# Patient Record
Sex: MALE | Race: WHITE | NOT HISPANIC OR LATINO | Employment: OTHER | URBAN - METROPOLITAN AREA
[De-identification: names, ages, dates, MRNs, and addresses within clinical notes are randomized per-mention and may not be internally consistent; named-entity substitution may affect disease eponyms.]

---

## 2022-12-22 ENCOUNTER — OFFICE VISIT (OUTPATIENT)
Dept: PULMONOLOGY | Facility: CLINIC | Age: 47
End: 2022-12-22

## 2022-12-22 VITALS
WEIGHT: 265 LBS | DIASTOLIC BLOOD PRESSURE: 90 MMHG | HEIGHT: 72 IN | HEART RATE: 81 BPM | SYSTOLIC BLOOD PRESSURE: 148 MMHG | TEMPERATURE: 99.1 F | OXYGEN SATURATION: 96 % | BODY MASS INDEX: 35.89 KG/M2

## 2022-12-22 DIAGNOSIS — J30.89 SEASONAL AND PERENNIAL ALLERGIC RHINITIS: ICD-10-CM

## 2022-12-22 DIAGNOSIS — R05.3 CHRONIC COUGH: Primary | ICD-10-CM

## 2022-12-22 DIAGNOSIS — E66.9 OBESITY, CLASS II, BMI 35-39.9: ICD-10-CM

## 2022-12-22 DIAGNOSIS — J30.2 SEASONAL AND PERENNIAL ALLERGIC RHINITIS: ICD-10-CM

## 2022-12-22 DIAGNOSIS — K21.9 CHRONIC GERD: ICD-10-CM

## 2022-12-22 PROBLEM — E66.812 OBESITY, CLASS II, BMI 35-39.9: Status: ACTIVE | Noted: 2022-12-22

## 2022-12-22 PROCEDURE — 99204 OFFICE O/P NEW MOD 45 MIN: CPT | Performed by: INTERNAL MEDICINE

## 2022-12-22 RX ORDER — SOLIFENACIN SUCCINATE 5 MG/1
5 TABLET, FILM COATED ORAL DAILY
COMMUNITY
Start: 2022-12-19

## 2022-12-22 RX ORDER — MONTELUKAST SODIUM 10 MG/1
10 TABLET ORAL DAILY
COMMUNITY
Start: 2022-11-21

## 2022-12-22 RX ORDER — TADALAFIL 5 MG/1
5 TABLET ORAL
COMMUNITY
Start: 2012-10-22

## 2022-12-22 RX ORDER — POLYETHYLENE GLYCOL 3350 17 G/17G
POWDER, FOR SOLUTION ORAL
COMMUNITY
Start: 2022-12-19

## 2022-12-22 RX ORDER — OXYCODONE AND ACETAMINOPHEN 10; 325 MG/1; MG/1
TABLET ORAL
COMMUNITY
Start: 2012-08-22

## 2022-12-22 RX ORDER — CHLORTHALIDONE 25 MG/1
TABLET ORAL
COMMUNITY
Start: 2022-11-08

## 2022-12-22 RX ORDER — DULOXETIN HYDROCHLORIDE 60 MG/1
CAPSULE, DELAYED RELEASE ORAL
COMMUNITY
Start: 2018-09-22

## 2022-12-22 RX ORDER — FEXOFENADINE HCL 180 MG/1
TABLET ORAL
COMMUNITY
Start: 2010-12-22

## 2022-12-22 RX ORDER — ATENOLOL 100 MG/1
100 TABLET ORAL EVERY EVENING
COMMUNITY
Start: 2022-12-17

## 2022-12-22 RX ORDER — DOXAZOSIN 8 MG/1
8 TABLET ORAL DAILY
COMMUNITY
Start: 2022-12-19

## 2022-12-22 RX ORDER — GABAPENTIN 600 MG/1
600 TABLET ORAL 3 TIMES DAILY
COMMUNITY

## 2022-12-22 RX ORDER — NAPROXEN SODIUM 220 MG
TABLET ORAL
COMMUNITY
Start: 2012-08-22

## 2022-12-22 RX ORDER — FENTANYL 75 UG/H
PATCH TRANSDERMAL
COMMUNITY
Start: 2012-12-22

## 2022-12-22 RX ORDER — OMEPRAZOLE 40 MG/1
CAPSULE, DELAYED RELEASE ORAL
COMMUNITY
Start: 2012-10-22

## 2022-12-22 RX ORDER — IPRATROPIUM BROMIDE 21 UG/1
2 SPRAY, METERED NASAL EVERY 12 HOURS
Qty: 1 EACH | Refills: 12 | Status: SHIPPED | OUTPATIENT
Start: 2022-12-22

## 2022-12-22 NOTE — PROGRESS NOTES
PULMONARY  NOTE    Chief Complaint     Chronic cough, non-smoker, left lower extremity paralysis following surgery    History of Present Illness     47-year-old male referred for chronic cough    He is a non-smoker with no past history of known lung disease    Has had a chronic cough since approximately February 2022  He does not recollect any preceding illness such as COVID that precipitated the cough    The cough has been constant but worse at night  He typically produces a lot of mucus at night  Has had no hemoptysis    He does have postnasal drip and a globus sensation on a regular basis    He had been on lisinopril but that was changed.  That did not impact his cough    An empiric trial on antibiotics and steroids helped transiently    He was started on Prilosec empirically but that did not seem to impact his cough, either  He does not describe regular reflux symptoms    He has had a paralyzed left lower extremity since back surgery in 2012  He gets around an electric wheelchair    Patient Active Problem List   Diagnosis   • Chronic cough   • Perennial rhinitis   • R/O GERD   • Obesity, Class II, BMI 35-39.9     No Known Allergies    Current Outpatient Medications:   •  atenolol (TENORMIN) 100 MG tablet, Take 100 mg by mouth Every Evening., Disp: , Rfl:   •  chlorthalidone (HYGROTON) 25 MG tablet, , Disp: , Rfl:   •  doxazosin (CARDURA) 8 MG tablet, Take 8 mg by mouth Daily., Disp: , Rfl:   •  DULoxetine (CYMBALTA) 60 MG capsule, Cymbalta 60 mg capsule,delayed release  Take 1 capsule every day by oral route as directed for 30 days., Disp: , Rfl:   •  fentaNYL (DURAGESIC) 75 MCG/HR patch, fentanyl 75 mcg/hr transdermal patch  Apply 1 patch every 72 hours by transdermal route as directed for 30 days., Disp: , Rfl:   •  fexofenadine (ALLEGRA) 180 MG tablet, , Disp: , Rfl:   •  gabapentin (NEURONTIN) 600 MG tablet, Take 600 mg by mouth 3 (Three) Times a Day., Disp: , Rfl:   •  montelukast (SINGULAIR) 10 MG  "tablet, Take 10 mg by mouth Daily., Disp: , Rfl:   •  naproxen sodium (ALEVE) 220 MG tablet, , Disp: , Rfl:   •  omeprazole (priLOSEC) 40 MG capsule, , Disp: , Rfl:   •  oxyCODONE-acetaminophen (PERCOCET)  MG per tablet, oxycodone-acetaminophen 10 mg-325 mg tablet  Take 1 tablet every 6-8 hours by oral route as directed for 30 days., Disp: , Rfl:   •  polyethylene glycol (MIRALAX) 17 GM/SCOOP powder, mix 1 cap full with 8 ounces of liquid, Disp: , Rfl:   •  solifenacin (VESICARE) 5 MG tablet, Take 5 mg by mouth Daily., Disp: , Rfl:   •  tadalafil (CIALIS) 5 MG tablet, Take 5 mg by mouth., Disp: , Rfl:   MEDICATION LIST AND ALLERGIES REVIEWED.    Family History   Problem Relation Age of Onset   • Diabetes Mother    • Hypertension Mother    • Cancer Father    • Diabetes Father    • Hypertension Father    • Hypertension Sister    • Asthma Maternal Grandmother    • Hypertension Maternal Grandfather    • Cancer Paternal Grandfather    • Hypertension Paternal Grandfather      Social History     Tobacco Use   • Smoking status: Never   • Smokeless tobacco: Never   Vaping Use   • Vaping Use: Never used   Substance Use Topics   • Alcohol use: Never   • Drug use: Never     Social History     Social History Narrative        Disabled    Lifelong non-smoker    Does not drink alcohol     FAMILY AND SOCIAL HISTORY REVIEWED.    Review of Systems  ALSO REFER TO SCANNED ROS SHEET FROM SAME DATE.    /90   Pulse 81   Temp 99.1 °F (37.3 °C) (Infrared)   Ht 182.9 cm (72\")   Wt 120 kg (265 lb)   SpO2 96% Comment: RESTING ROOM AIR  BMI 35.94 kg/m²   Physical Exam  Vitals and nursing note reviewed.   Constitutional:       General: He is not in acute distress.     Appearance: He is well-developed. He is not diaphoretic.   HENT:      Head: Normocephalic and atraumatic.   Neck:      Thyroid: No thyromegaly.   Cardiovascular:      Rate and Rhythm: Normal rate and regular rhythm.      Heart sounds: Normal heart sounds. No " murmur heard.  Pulmonary:      Effort: Pulmonary effort is normal.      Breath sounds: Normal breath sounds. No stridor.   Lymphadenopathy:      Cervical: No cervical adenopathy.      Upper Body:      Right upper body: No supraclavicular or epitrochlear adenopathy.      Left upper body: No supraclavicular or epitrochlear adenopathy.   Skin:     General: Skin is warm and dry.   Neurological:      Mental Status: He is alert and oriented to person, place, and time.      Comments: Patient got around the office today an electric wheelchair   Psychiatric:         Behavior: Behavior normal.         Results     Chest x-ray done at [facility] on 11/14/2022 revealed no effusions, infiltrates, or consolidation    PFTs also done at [facility] on 12/1/2022 reviewed  No airway obstruction  Reduced TLC but validity in question as the single breath alveolar volume appears to be greater than the TLC    Immunization History   Administered Date(s) Administered   • COVID-19 (MODERNA) 1st, 2nd, 3rd Dose Only 05/17/2021, 06/14/2021, 12/14/2021   • COVID-19 (MODERNA) BIVALENT BOOSTER 12+YRS 11/29/2022   • Influenza, Unspecified 12/14/2021, 11/29/2022     Problem List       ICD-10-CM ICD-9-CM   1. Chronic cough  R05.3 786.2   2. Perennial rhinitis  J30.89 477.9    J30.2    3. R/O GERD  K21.9 530.81   4. Obesity, Class II, BMI 35-39.9  E66.9 278.00       Discussion     His exam is unremarkable  Chest x-ray was clear last month  PFTs suggest a restrictive defect but the values are invalid and that the single breath alveolar volume is greater than the TLC which is not physiologically possible    He does appear to have perennial rhinitis symptoms which may be contributing  Despite his lack of response to empiric Prilosec, reflux may be a contributing factor as well    I gave him an information sheet on acid reflux we discussed reflux precautions  I am going to put him on Atrovent nasal spray in the evening and add an antihistamine to  his Singulair for his perennial rhinitis symptoms    In addition, we will obtain a CT scan of the chest to help evaluate for underlying interstitial lung disease    If he does have evidence of interstitial lung disease and we will probably go ahead and repeat his PFTs and try to get an accurate assessment of static lung volumes    I plan to see him back after the above    Moderate level of Medical Decision Making complexity based on 1 undiagnosed new problem, independent interpretation of tests, and/or prescription drug management     Anjel Foreman MD  Note electronically signed    CC: Yunier Spring MD

## 2022-12-23 DIAGNOSIS — R05.3 CHRONIC COUGH: Primary | ICD-10-CM

## 2022-12-29 ENCOUNTER — TRANSCRIBE ORDERS (OUTPATIENT)
Dept: PULMONOLOGY | Facility: CLINIC | Age: 47
End: 2022-12-29
Payer: MEDICARE

## 2022-12-30 ENCOUNTER — PATIENT ROUNDING (BHMG ONLY) (OUTPATIENT)
Dept: PULMONOLOGY | Facility: CLINIC | Age: 47
End: 2022-12-30

## 2022-12-30 NOTE — PROGRESS NOTES
December 30, 2022    Hello, may I speak with Frankie Mello?    My name is Pepito Perez     I am  with [facility]  [address] [address] [phone].    Before we get started may I verify your date of birth? 1975    I am calling to officially welcome you to our practice and ask about your recent visit. Is this a good time to talk? YES    Tell me about your visit with us. What things went well? EVERYTHING WAS GOOD. THE DR WAS GREAT        We're always looking for ways to make our patients' experiences even better. Do you have recommendations on ways we may improve?  NO    Overall were you satisfied with your first visit to our practice? YES       I appreciate you taking the time to speak with me today. Is there anything else I can do for you? NO      Thank you, and have a great day.

## 2023-01-11 DIAGNOSIS — R05.3 CHRONIC COUGH: ICD-10-CM

## 2023-01-23 ENCOUNTER — OFFICE VISIT (OUTPATIENT)
Dept: PULMONOLOGY | Facility: CLINIC | Age: 48
End: 2023-01-23
Payer: MEDICARE

## 2023-01-23 VITALS
HEART RATE: 68 BPM | TEMPERATURE: 98 F | HEIGHT: 72 IN | SYSTOLIC BLOOD PRESSURE: 124 MMHG | BODY MASS INDEX: 35.94 KG/M2 | DIASTOLIC BLOOD PRESSURE: 80 MMHG | OXYGEN SATURATION: 98 %

## 2023-01-23 DIAGNOSIS — R05.3 CHRONIC COUGH: Primary | ICD-10-CM

## 2023-01-23 DIAGNOSIS — J30.89 SEASONAL AND PERENNIAL ALLERGIC RHINITIS: ICD-10-CM

## 2023-01-23 DIAGNOSIS — J30.2 SEASONAL AND PERENNIAL ALLERGIC RHINITIS: ICD-10-CM

## 2023-01-23 DIAGNOSIS — Z00.6 EXAMINATION FOR NORMAL COMPARISON OR CONTROL IN CLINICAL RESEARCH: Primary | ICD-10-CM

## 2023-01-23 PROCEDURE — 99213 OFFICE O/P EST LOW 20 MIN: CPT | Performed by: NURSE PRACTITIONER

## 2023-01-23 RX ORDER — AMLODIPINE BESYLATE 10 MG/1
10 TABLET ORAL DAILY
COMMUNITY
Start: 2023-01-12

## 2023-01-23 RX ORDER — FLUTICASONE PROPIONATE 50 MCG
2 SPRAY, SUSPENSION (ML) NASAL DAILY
COMMUNITY
Start: 2023-01-12

## 2023-01-23 NOTE — PROGRESS NOTES
"[facility] Pulmonary Follow up      Chief Complaint  Cough and Follow-up    Subjective          Frankie Mello presents to [facility] for follow-up on a CT scan chest.  He was seen by Dr. Foreman for chronic cough.  He had some abnormal PFTs but were felt and accurate.  He sent him for a CT scan to rule out any interstitial changes.  He also started on some ipratropium nasal spray.  He also takes Singulair and an antihistamine and has been on Flonase in the past as well.    He does feel like the cough is primarily postnasal drainage.  The cough is significantly improved with the addition of the ipratropium during the day.  He still has a bit of trouble at night especially after he lies down for a few hours he will wake up with a thick persistent cough.      Objective     Vital Signs:   /80   Pulse 68   Temp 98 °F (36.7 °C)   Ht 182.9 cm (72\")   SpO2 98% Comment: resting, room air  BMI 35.94 kg/m²       Immunization History   Administered Date(s) Administered   • COVID-19 (MODERNA) 1st, 2nd, 3rd Dose Only 05/17/2021, 06/14/2021, 12/14/2021   • COVID-19 (MODERNA) BIVALENT BOOSTER 12+YRS 11/29/2022   • Influenza, Unspecified 12/14/2021, 11/29/2022       Physical Exam  Vitals reviewed.   Constitutional:       General: He is not in acute distress.     Appearance: He is well-developed. He is not ill-appearing.   HENT:      Head: Normocephalic and atraumatic.   Eyes:      Pupils: Pupils are equal, round, and reactive to light.   Cardiovascular:      Rate and Rhythm: Normal rate and regular rhythm.      Heart sounds: No murmur heard.  Pulmonary:      Effort: Pulmonary effort is normal. No respiratory distress.      Breath sounds: Normal breath sounds. No wheezing or rales.   Abdominal:      General: Bowel sounds are normal. There is no distension.      Palpations: Abdomen is soft.   Musculoskeletal:         General: Normal range of motion.      Cervical back: " Normal range of motion and neck supple.   Skin:     General: Skin is warm and dry.      Findings: No erythema.   Neurological:      Mental Status: He is alert and oriented to person, place, and time.   Psychiatric:         Behavior: Behavior normal.          Result Review :            CT of the chest report from [facility]               Assessment and Plan    Problem List Items Addressed This Visit        Allergies and Adverse Reactions    Perennial rhinitis    Relevant Orders    Ambulatory Referral to ENT (Otolaryngology) (Completed)       Pulmonary and Pneumonias    Chronic cough - Primary    Relevant Orders    Ambulatory Referral to ENT (Otolaryngology) (Completed)     We reviewed his CT scan with no acute findings, no interstitial changes.    We discussed his cough today.  It does appear mostly a postnasal drainage upper airway cough.  I will refer him over to ear nose and throat for further management.  We did discuss this cough could be related to nasal polyps or chronic sinusitis.    He will follow-up here in the office as needed.    Follow Up     No follow-ups on file.  Patient was given instructions and counseling regarding his condition or for health maintenance advice. Please see specific information pulled into the AVS if appropriate.     I spent 22 minutes caring for Frankie on this date of service. This time includes time spent by me in the following activities:reviewing tests, obtaining and/or reviewing a separately obtained history, performing a medically appropriate examination and/or evaluation , counseling and educating the patient/family/caregiver and documenting information in the medical record      ELLIOTT Malave, ACNP  [facility]  Electronically signed

## 2024-07-20 ENCOUNTER — OUTSIDE FACILITY SERVICE (OUTPATIENT)
Dept: CARDIOLOGY | Facility: CLINIC | Age: 49
End: 2024-07-20
Payer: MEDICARE

## 2024-07-20 PROCEDURE — 93227 XTRNL ECG REC<48 HR R&I: CPT | Performed by: INTERNAL MEDICINE

## 2024-11-22 ENCOUNTER — OUTSIDE FACILITY SERVICE (OUTPATIENT)
Dept: CARDIOLOGY | Facility: CLINIC | Age: 49
End: 2024-11-22
Payer: MEDICARE

## 2024-11-22 PROCEDURE — 93244 EXT ECG>48HR<7D REV&INTERPJ: CPT | Performed by: INTERNAL MEDICINE
